# Patient Record
Sex: FEMALE | Race: WHITE | HISPANIC OR LATINO | Employment: FULL TIME | URBAN - METROPOLITAN AREA
[De-identification: names, ages, dates, MRNs, and addresses within clinical notes are randomized per-mention and may not be internally consistent; named-entity substitution may affect disease eponyms.]

---

## 2023-09-30 RX ORDER — METOPROLOL SUCCINATE 50 MG/1
TABLET, EXTENDED RELEASE ORAL
COMMUNITY
Start: 2023-08-25

## 2023-09-30 RX ORDER — LISINOPRIL 40 MG/1
TABLET ORAL
COMMUNITY
Start: 2023-08-25

## 2023-09-30 RX ORDER — MELOXICAM 15 MG/1
TABLET ORAL
COMMUNITY
Start: 2022-12-07

## 2023-09-30 RX ORDER — ATORVASTATIN CALCIUM 20 MG/1
TABLET, FILM COATED ORAL
COMMUNITY
Start: 2023-06-29

## 2023-09-30 RX ORDER — METOPROLOL SUCCINATE 25 MG/1
TABLET, EXTENDED RELEASE ORAL
COMMUNITY
Start: 2023-01-04

## 2023-09-30 RX ORDER — GABAPENTIN 300 MG/1
CAPSULE ORAL
COMMUNITY
Start: 2023-03-09

## 2023-09-30 RX ORDER — SERTRALINE HYDROCHLORIDE 100 MG/1
TABLET, FILM COATED ORAL
COMMUNITY
Start: 2023-07-10

## 2023-09-30 RX ORDER — GABAPENTIN 100 MG/1
CAPSULE ORAL
COMMUNITY
Start: 2023-03-03

## 2023-09-30 RX ORDER — FLUTICASONE PROPIONATE AND SALMETEROL 250; 50 UG/1; UG/1
POWDER RESPIRATORY (INHALATION)
COMMUNITY
Start: 2023-08-25

## 2023-10-03 ENCOUNTER — OFFICE VISIT (OUTPATIENT)
Dept: OTOLARYNGOLOGY | Facility: CLINIC | Age: 63
End: 2023-10-03
Payer: COMMERCIAL

## 2023-10-03 VITALS
TEMPERATURE: 96.1 F | BODY MASS INDEX: 27.64 KG/M2 | HEIGHT: 66 IN | WEIGHT: 172 LBS | SYSTOLIC BLOOD PRESSURE: 162 MMHG | DIASTOLIC BLOOD PRESSURE: 83 MMHG

## 2023-10-03 DIAGNOSIS — J30.9 CHRONIC ALLERGIC RHINITIS: ICD-10-CM

## 2023-10-03 DIAGNOSIS — H90.3 BILATERAL SENSORINEURAL HEARING LOSS: ICD-10-CM

## 2023-10-03 DIAGNOSIS — H80.93 OTOSCLEROSIS OF BOTH EARS: Primary | ICD-10-CM

## 2023-10-03 PROCEDURE — 99204 OFFICE O/P NEW MOD 45 MIN: CPT | Performed by: OTOLARYNGOLOGY

## 2023-10-03 PROCEDURE — 1159F MED LIST DOCD IN RCRD: CPT | Performed by: OTOLARYNGOLOGY

## 2023-10-03 PROCEDURE — 1160F RVW MEDS BY RX/DR IN RCRD: CPT | Performed by: OTOLARYNGOLOGY

## 2023-10-03 PROCEDURE — 1036F TOBACCO NON-USER: CPT | Performed by: OTOLARYNGOLOGY

## 2023-10-03 NOTE — PROGRESS NOTES
Impression:     1. Otosclerosis of both ears        2. Bilateral sensorineural hearing loss        3. Chronic allergic rhinitis                 RECOMMENDATIONS/PLAN :  I reassured the patient and  that it appears that her previous stapes surgery is working for both ears.  Medically her ears look excellent there is no evidence of any effusion.  Her tympanic membranes are moving well today.  I want her to restart her Flonase nasal spray-2 puffs each nostril daily for the next several weeks.  This will help with her allergy issues and congestion.  I recommend a repeat audiogram in January 2024.  I want her to touch base with her hearing aid dispenser to see if they can tweak her hearing aid because it seems like her left hearing aid is a little too sensitive.  All of her concerns were addressed today.    **This electronic medical record note was created with the use of voice recognition software.  Despite proofreading, typographical or grammatical errors may be present that could affect meaning of content **    Subjective   Patient ID:   Mine Flores is a 63 y.o. female who presents with a previous history of bilateral otosclerosis.  She has had surgery on both years to replace her stapes bilaterally.  She has done well and has not had any recent middle ear infections or drainage from the ears.  She denies any vertigo or neurologic complaints.  She states that her left ear is somewhat sensitive and oftentimes it is too loud when she is wearing her hearing aid.  However she also has noticed that certain words drop off and she cannot distinguish what people are saying.  I did review her previous audiogram from July 2023 and it appears that her air-bone gap has closed in both ears.  She still has a bilateral sensorineural loss that is stable.    ROS:  A detailed 12 system review of systems is noted on the intake form has been reviewed with the patient with details noted in the HPI and scanned into the patient's  "medical record.      Objective     Visit Vitals  /83   Temp 35.6 °C (96.1 °F) (Temporal)   Ht 1.676 m (5' 6\")   Wt 78 kg (172 lb)   BMI 27.76 kg/m²   Smoking Status Never   BSA 1.91 m²        No past medical history on file.     No past surgical history on file.     No Known Allergies       Current Outpatient Medications:     atorvastatin (Lipitor) 20 mg tablet, , Disp: , Rfl:     fluticasone propion-salmeteroL (Advair Diskus) 250-50 mcg/dose diskus inhaler, , Disp: , Rfl:     lisinopril 40 mg tablet, , Disp: , Rfl:     metoprolol succinate XL (Toprol-XL) 25 mg 24 hr tablet, , Disp: , Rfl:     metoprolol succinate XL (Toprol-XL) 50 mg 24 hr tablet, , Disp: , Rfl:     sertraline (Zoloft) 100 mg tablet, , Disp: , Rfl:     gabapentin (Neurontin) 100 mg capsule, , Disp: , Rfl:     gabapentin (Neurontin) 300 mg capsule, , Disp: , Rfl:     meloxicam (Mobic) 15 mg tablet, , Disp: , Rfl:      Tobacco Use: Low Risk  (10/3/2023)    Patient History     Smoking Tobacco Use: Never     Smokeless Tobacco Use: Never     Passive Exposure: Not on file        Alcohol Use: Not on file        Social History     Substance and Sexual Activity   Drug Use Not on file        Physical Exam:    General: Patient is alert, oriented, cooperative in no apparent distress.  Head: Normocephalic, atraumatic.  Eyes: PERRL, EOMI, Conjunctiva is clear. No nystagmus.  Ears: Right Ear-- Pinna is normal.  External auditory canal is patent, no lesions. Tympanic membrane is [intact, translucent and has good mobility with my pneumatic otoscope. No effusion].  Mastoid is nontender.  512 Hz tuning fork shows air conduction greater than bone conduction  Left ear-- Pinna is normal.  External auditory canal is patent, no lesions.  Tympanic membrane is [intact, translucent and has good mobility with my pneumatic otoscope.  No effusion].  Mastoid is nontender.  512 Hz tuning fork shows air conduction greater than bone conduction  Nose: Septum is straight.  No " septal perforation or lesions. No septal hematoma/ seroma.  No signs of bleeding.  Inferior turbinates are normal.   No evidence of intranasal polyps.    Throat:  Floor of mouth is clear, no masses.  Tongue appears normal, no lesions or masses. Gums, gingiva, buccal mucosa appear pink and moist, no lesions. Teeth are in good repair.  No obvious dental infections.  Peritonsillar regions appear symmetric without swelling.  Hard and soft palate appear normal, no obvious cleft. Uvula is midline.  Oropharynx: No lesions. Retropharyngeal wall is flat.  No active postnasal drip.  Neck: Supple,  no lymphadenopathy.  No masses.  Salivary Glands: Symmetric bilaterally.  No palpable masses.  No evidence of acute infection or salivary stones  Neurologic: Cranial Nerves 2-12 are grossly intact without focal deficits. Cerebellar function testing is normal.     Results: I reviewed her previous audiogram from July 26, 2023 from the [facility] and the patient does have a bilateral sensorineural hearing loss and this is worse/asymmetric in the right ear.  Her previous air-bone gap has closed.  Her speech reception threshold was 60 dB in the right ear and 45 dB in the left ear    Procedure: []    Marcial Huang, DO